# Patient Record
Sex: FEMALE | Race: BLACK OR AFRICAN AMERICAN | Employment: UNEMPLOYED | URBAN - METROPOLITAN AREA
[De-identification: names, ages, dates, MRNs, and addresses within clinical notes are randomized per-mention and may not be internally consistent; named-entity substitution may affect disease eponyms.]

---

## 2017-08-03 ENCOUNTER — PROCEDURE VISIT (OUTPATIENT)
Dept: VASCULAR SURGERY | Age: 77
End: 2017-08-03

## 2017-08-03 DIAGNOSIS — Z48.812 ENCOUNTER FOR POSTOPERATIVE SURVEILLANCE OF VASCULAR BYPASS SURGERY: Primary | ICD-10-CM

## 2017-08-10 ENCOUNTER — PROCEDURE VISIT (OUTPATIENT)
Dept: VASCULAR SURGERY | Age: 77
End: 2017-08-10

## 2017-08-10 DIAGNOSIS — Z48.812 ENCOUNTER FOR POSTOPERATIVE SURVEILLANCE OF VASCULAR BYPASS SURGERY: ICD-10-CM

## 2017-08-10 PROCEDURE — 93926 LOWER EXTREMITY STUDY: CPT | Performed by: SURGERY

## 2017-08-15 ENCOUNTER — TELEPHONE (OUTPATIENT)
Dept: VASCULAR SURGERY | Age: 77
End: 2017-08-15

## 2018-08-23 ENCOUNTER — OFFICE VISIT (OUTPATIENT)
Dept: VASCULAR SURGERY | Age: 78
End: 2018-08-23

## 2018-08-23 ENCOUNTER — PROCEDURE VISIT (OUTPATIENT)
Dept: VASCULAR SURGERY | Age: 78
End: 2018-08-23

## 2018-08-23 VITALS
HEIGHT: 62 IN | BODY MASS INDEX: 21.35 KG/M2 | DIASTOLIC BLOOD PRESSURE: 84 MMHG | SYSTOLIC BLOOD PRESSURE: 136 MMHG | WEIGHT: 116 LBS

## 2018-08-23 DIAGNOSIS — Z48.812 ENCOUNTER FOR POSTOPERATIVE SURVEILLANCE OF VASCULAR BYPASS SURGERY: ICD-10-CM

## 2018-08-23 DIAGNOSIS — I73.9 PVD (PERIPHERAL VASCULAR DISEASE) (HCC): Primary | ICD-10-CM

## 2018-08-23 DIAGNOSIS — Z48.812 ENCOUNTER FOR POSTOPERATIVE SURVEILLANCE OF VASCULAR BYPASS SURGERY: Primary | ICD-10-CM

## 2018-08-23 PROCEDURE — 93926 LOWER EXTREMITY STUDY: CPT | Performed by: SURGERY

## 2018-08-23 PROCEDURE — 4040F PNEUMOC VAC/ADMIN/RCVD: CPT | Performed by: SURGERY

## 2018-08-23 PROCEDURE — G8400 PT W/DXA NO RESULTS DOC: HCPCS | Performed by: SURGERY

## 2018-08-23 PROCEDURE — G8420 CALC BMI NORM PARAMETERS: HCPCS | Performed by: SURGERY

## 2018-08-23 PROCEDURE — 4004F PT TOBACCO SCREEN RCVD TLK: CPT | Performed by: SURGERY

## 2018-08-23 PROCEDURE — 1123F ACP DISCUSS/DSCN MKR DOCD: CPT | Performed by: SURGERY

## 2018-08-23 PROCEDURE — 99214 OFFICE O/P EST MOD 30 MIN: CPT | Performed by: SURGERY

## 2018-08-23 PROCEDURE — G8427 DOCREV CUR MEDS BY ELIG CLIN: HCPCS | Performed by: SURGERY

## 2018-08-23 PROCEDURE — 1090F PRES/ABSN URINE INCON ASSESS: CPT | Performed by: SURGERY

## 2018-08-23 PROCEDURE — G8598 ASA/ANTIPLAT THER USED: HCPCS | Performed by: SURGERY

## 2018-08-23 PROCEDURE — 1101F PT FALLS ASSESS-DOCD LE1/YR: CPT | Performed by: SURGERY

## 2018-08-23 NOTE — PROGRESS NOTES
HPI   Routine surveillance appointment S/P fem-fem 10/2013 S/P L fem-peroneal bypass  at [facility]. Doing well without claudication and /or rest pain but walking slowly due to L knee pain. Still smoking (1/2 ppd). Notes some L foot and calf swelling (\"especially when I smoke\"). Not wearing stockings. Significant other  2 months ago unexpectedly.     Past Medical History         Past Medical History   Diagnosis Date    Bilateral cataracts      Cervical cancer screening        Nml per pt':under care of gyn    Folic acid deficiency      H/O mammogram        Nml per pt'    Hyperlipidemia      Hypertension      Peripheral vascular disease ([facility] [address]         Under care of Dr. Cait Moran specialist    S/P colonoscopy 3/2007       Nml per pt'              Allergies   Allergen Reactions    Penicillins      Vicodin [Hydrocodone-Acetaminophen]        Current Facility-Administered Medications          Current Outpatient Prescriptions   Medication Sig Dispense Refill    Lidocaine-Hydrocortisone Ace 2-2 % KIT Place rectally        atorvastatin (LIPITOR) 40 MG tablet Take 1 tablet by mouth daily 30 tablet 1    rivaroxaban (XARELTO) 15 MG TABS tablet Take 15 mg by mouth 2 times daily (with meals)        hydrochlorothiazide (HYDRODIURIL) 25 MG tablet Take 1 tablet by mouth daily 30 tablet 0      No current facility-administered medications for this visit.          Social History   Social History            Social History    Marital status:         Spouse name: N/A    Number of children: N/A    Years of education: N/A           Occupational History    Retired                 Social History Main Topics    Smoking status: Current Some Day Smoker       Packs/day: 1.00       Years: 30.00       Types: Cigarettes    Smokeless tobacco: Never Used    Alcohol use 0.6 oz/week        1 Glasses of wine per week          Comment: Occasional    Drug use: No    Sexual activity: Not on file     brachial       2    radial       2 +   femoral 2 +    0    popliteal 0     0    posterior tibial 0     0    dorsalis pedis 0          bypass graft 2 0      GSS today reviewed - low graft velocities 29-65 cm/sec (large caliber vein: 5-7.9 mm); unchanged since last surveillance. F-F with nl velocities except R fem 214 cm/sec - caliber of inflow smaller from prior scam (3.9 cm)   Assessment:     S/P femfem S/P L fem-peroneal bypass - patent with possible inflow stenosis. Slow flows due to graft caliber (unchanged from previous studies)   Mild L leg edema. Plan:     Standard surveillance protocol with graft scan in 12 months + MD appointment. Will call and discuss angio for assessment of inflow. Continue anticoagulation. Begin 15/20 mmHg KH compression stocking - prescription given.   Time with pt 15 mins.

## 2018-08-28 RX ORDER — SIMVASTATIN 20 MG
20 TABLET ORAL NIGHTLY
COMMUNITY

## 2018-08-28 RX ORDER — FOLIC ACID 1 MG/1
1 TABLET ORAL DAILY
COMMUNITY